# Patient Record
Sex: MALE | Race: WHITE
[De-identification: names, ages, dates, MRNs, and addresses within clinical notes are randomized per-mention and may not be internally consistent; named-entity substitution may affect disease eponyms.]

---

## 2019-03-30 ENCOUNTER — HOSPITAL ENCOUNTER (EMERGENCY)
Dept: HOSPITAL 56 - MW.ED | Age: 24
Discharge: HOME | End: 2019-03-30
Payer: COMMERCIAL

## 2019-03-30 DIAGNOSIS — K08.89: Primary | ICD-10-CM

## 2019-03-30 NOTE — EDM.PDOC
ED HPI GENERAL MEDICAL PROBLEM





- General


Chief Complaint: General


Stated Complaint: TOOTH HURTS


Time Seen by Provider: 03/30/19 01:16





- History of Present Illness


INITIAL COMMENTS - FREE TEXT/NARRATIVE: 


HISTORY AND PHYSICAL:





History of present illness:


Patient's 23-year-old white male presents with concern of dental pain, fever 

chills nausea vomiting or other complaints





Review of systems: 


As per history of present illness and below otherwise all systems reviewed and 

negative.





Past medical history: 


As per history of present illness and as reviewed below otherwise 

noncontributory.





Surgical history: 


As per history of present illness and as reviewed below otherwise 

noncontributory.





Social history: 


No reported history of drug or alcohol abuse.





Family history: 


As per history of present illness and as reviewed below otherwise 

noncontributory.





Physical exam:


HEENT: Atraumatic, normocephalic, pupils reactive, negative for conjunctival 

pallor or scleral icterus, mucous membranes moist, throat clear, neck supple, 

nontender, trachea midline.


Lungs: Clear to auscultation, breath sounds equal bilaterally, chest nontender.


Heart: S1S2, regular, negative for clicks, rubs, or JVD.


Abdomen: Soft, nondistended, nontender. Negative for masses or 

hepatosplenomegaly. Negative for costovertebral tenderness.


Pelvis: Stable nontender.


Genitourinary: Deferred.


Rectal: Deferred.


Extremities: Atraumatic, negative for cords or calf pain. Neurovascular 

unremarkable.


Neuro: Awake, alert, oriented. Cranial nerves II through XII unremarkable. 

Cerebellum unremarkable. Motor and sensory unremarkable throughout. Exam 

nonfocal.





Diagnostics:


None





Therapeutics:


None





Impression: 


#1 dentalgia





Definitive disposition and diagnosis as appropriate pending reevaluation and 

review of above.





  ** Right Lower Tooth/Teeth


Pain Score (Numeric/FACES): 8





- Related Data


Home Meds: 


 Home Meds





. [No Known Home Meds]  03/30/19 [History]











ED ROS GENERAL





- Review of Systems


Review Of Systems: ROS reveals no pertinent complaints other than HPI.





ED EXAM, GENERAL





- Physical Exam


Exam: See Below





Departure





- Departure


Time of Disposition: 01:18


Disposition: Home, Self-Care 01


Condition: Good


Clinical Impression: 


 Dentalgia








- Discharge Information


Referrals: 


PCP,None [Primary Care Provider] - 


Additional Instructions: 


The following information is given to patients seen in the emergency department 

who are being discharged to home. This information is to outline your options 

for follow-up care. We provide all patients seen in our emergency department 

with a follow-up referral.





The need for follow-up, as well as the timing and circumstances, are variable 

depending upon the specifics of your emergency department visit.





If you don't have a primary care physician on staff, we will provide you with a 

referral. We always advise you to contact your personal physician following an 

emergency department visit to inform them of the circumstance of the visit and 

for follow-up with them and/or the need for any referrals to a consulting 

specialist.





The emergency department will also refer you to a specialist when appropriate. 

This referral assures that you have the opportunity for followup care with a 

specialist. All of these measure are taken in an effort to provide you with 

optimal care, which includes your followup.





Under all circumstances we always encourage you to contact your private 

physician who remains a resource for coordinating  your care. When calling for 

followup care, please make the office aware that this follow-up is from your 

recent emergency room visit. If for any reason you are refused follow-up, 

please contact the [facility] emergency department at [phone] 

and asked to speak to the emergency department charge nurse.











Augmentin Naprosyn as prescribed follow-up dentist as discussed return as 

needed as discussed

## 2021-02-13 ENCOUNTER — HOSPITAL ENCOUNTER (EMERGENCY)
Dept: HOSPITAL 56 - MW.ED | Age: 26
Discharge: HOME | End: 2021-02-13
Payer: SELF-PAY

## 2021-02-13 DIAGNOSIS — K04.7: Primary | ICD-10-CM

## 2021-02-13 PROCEDURE — 99282 EMERGENCY DEPT VISIT SF MDM: CPT

## 2021-02-13 NOTE — EDM.PDOC
ED HPI GENERAL MEDICAL PROBLEM





- General


Chief Complaint: ENT Problem


Stated Complaint: ABSCESS TOOTH


Time Seen by Provider: 02/13/21 22:45





- History of Present Illness


INITIAL COMMENTS - FREE TEXT/NARRATIVE: 


Otherwise well 25-year-old male presenting with pain and the right lower molar 

associated with a tooth fracture.  The pain has become quite severe over the 

last couple days.  There is no fever the pain worsens with attempting to eat or 

drink but there is no pain or difficulty swallowing.  No neck pain or stiffness.

 No other symptoms.


  ** Right Lower Tooth/Teeth


Pain Score (Numeric/FACES): 10





- Related Data


                                    Allergies











Allergy/AdvReac Type Severity Reaction Status Date / Time


 


No Known Allergies Allergy   Verified 02/13/21 22:54











Home Meds: 


                                    Home Meds





Acetaminophen/HYDROcodone [Norco 325-5 MG] 1 tab PO ONETIME 3 Days #9 tablet 

02/13/21 [Rx]


Ibuprofen 600 mg PO TIDMEALS 5 Days #15 tablet 02/13/21 [Rx]


Penicillin V Potassium 500 mg PO Q6HR 10 Days #40 tab 02/13/21 [Rx]











Past Medical History





- Past Health History


Medical/Surgical History: Denies Medical/Surgical History





Social & Family History





- Family History


Family Medical History: No Pertinent Family History





- Caffeine Use


Caffeine Use: Reports: None





ED ROS GENERAL





- Review of Systems


Review Of Systems: See Below


Free Text/Narrative/Comment: 





General: No fever.


Eyes: No vision problems.


ENT: Per HPI


Neck: No neck stiffness.





ED EXAM, GENERAL





- Physical Exam


Exam: See Below


Free Text/Narrative:: 


General Appearance: No acute distress, appears comfortable


Skin: No rash


HEENT: Normocephalic/atraumatic, sclera anicteric, mucous membranes moist, right

 lower molar fractured there is no gingival edema there is no palpable 

fluctuance or abscess there is no trismus there is no submental or sublingual 

tenderness or swelling uvula is midline no sign of PTA or RPA


Neck: Normal range of motion


Neurologic: Awake, alert, no obvious deficits, moving all extremities


Psychiatric: Appropriate, cooperative





Course





- Vital Signs


Last Recorded V/S: 


                                Last Vital Signs











Temp  97.2 F   02/13/21 22:52


 


Pulse  67   02/13/21 23:15


 


Resp  18   02/13/21 23:15


 


BP  129/90   02/13/21 23:15


 


Pulse Ox  97   02/13/21 23:15














- Orders/Labs/Meds


Meds: 


Medications














Discontinued Medications














Generic Name Dose Route Start Last Admin





  Trade Name Robin  PRN Reason Stop Dose Admin


 


Hydrocodone Bitart/Acetaminophen  1 tab  02/13/21 22:58  02/13/21 23:06





  Norco 325-5 Mg  PO  02/13/21 22:59  1 tab





  ONETIME ONE   Administration


 


Penicillin V Potassium  500 mg  02/13/21 22:58  02/13/21 23:06





  Veetids  PO  02/13/21 22:59  500 mg





  NOW STA   Administration














Departure





- Departure


Time of Disposition: 22:59


Disposition: Home, Self-Care 01


Condition: Good


Clinical Impression: 


 Dental abscess








- Discharge Information


*PRESCRIPTION DRUG MONITORING PROGRAM REVIEWED*: Not Applicable


*COPY OF PRESCRIPTION DRUG MONITORING REPORT IN PATIENT BALJINDER: Not Applicable


Prescriptions: 


Ibuprofen 600 mg PO TIDMEALS 5 Days #15 tablet


Acetaminophen/HYDROcodone [Norco 325-5 MG] 1 tab PO ONETIME 3 Days #9 tablet


Penicillin V Potassium 500 mg PO Q6HR 10 Days #40 tab


Instructions:  Dental Abscess, Easy-to-Read


Referrals: 


PCP,None [Primary Care Provider] - 


Forms:  ED Department Discharge


Additional Instructions: 


Please follow-up with a dentist as soon as possible.





The following information is given to patients seen in the emergency department 

who are being discharged to home. This information is to outline your options 

for follow-up care. We provide all patients seen in our emergency department 

with a follow-up referral.





The need for follow-up, as well as the timing and circumstances, are variable 

depending upon the specifics of your emergency department visit.





If you don't have a primary care physician on staff, we will provide you with a 

referral. We always advise you to contact your personal physician following an 

emergency department visit to inform them of the circumstance of the visit and 

for follow-up with them and/or the need for any referrals to a consulting 

specialist.





The emergency department will also refer you to a specialist when appropriate. 

This referral assures that you have the opportunity for follow-up care with a 

specialist. All of these measure are taken in an effort to provide you with 

optimal care, which includes your follow-up.





Under all circumstances we always encourage you to contact your private 

physician who remains a resource for coordinating your care. When calling for 

follow-up care, please make the office aware that this follow-up is from your 

recent emergency room visit. If for any reason you are refused follow-up, please

contact the [facility] Emergency Department

at [phone] and asked to speak to the emergency department charge nurse.





Sepsis Event Note (ED)





- Evaluation


Sepsis Screening Result: No Definite Risk





- Focused Exam


Vital Signs: 


                                   Vital Signs











  Temp Pulse Resp BP Pulse Ox


 


 02/13/21 23:15   67  18  129/90  97


 


 02/13/21 22:52  97.2 F  62  18  127/87  99














- Assessment/Plan


Assessment:: 





Otherwise well 25-year-old male presenting with dental pain provided with anti-

inflammatories and antibiotics.  Return precautions discussed and understood 

dental follow-up encouraged.  No sign of deep space infection of the head or 

neck and no drainable abscess at this time.

## 2021-11-06 ENCOUNTER — HOSPITAL ENCOUNTER (EMERGENCY)
Dept: HOSPITAL 56 - MW.ED | Age: 26
LOS: 1 days | Discharge: HOME | End: 2021-11-07
Payer: SELF-PAY

## 2021-11-06 DIAGNOSIS — K08.89: Primary | ICD-10-CM

## 2021-11-06 PROCEDURE — 99282 EMERGENCY DEPT VISIT SF MDM: CPT

## 2021-11-07 NOTE — EDM.PDOC
ED HPI GENERAL MEDICAL PROBLEM





- General


Chief Complaint: General


Stated Complaint: POSSIBLE ABSCESS TOOTH


Time Seen by Provider: 11/06/21 23:56


Source of Information: Reports: Patient


History Limitations: Reports: No Limitations





- History of Present Illness


INITIAL COMMENTS - FREE TEXT/NARRATIVE: 





26-year-old male presents for tooth pain.  Patient has a cracked tooth on the 

right lower side of the face.  He had it for a long time.  It is heard on and 

off for several months but acutely began to become painful today.  He is 

planning on seeing his dentist on Monday but wanted some antibiotics and pain 

medicine for the weekend.


  ** Right Lower Tooth/Teeth


Pain Score (Numeric/FACES): 9





- Related Data


                                    Allergies











Allergy/AdvReac Type Severity Reaction Status Date / Time


 


No Known Allergies Allergy   Verified 11/07/21 00:01











Home Meds: 


                                    Home Meds





Acetaminophen/HYDROcodone [Norco 325-5 MG] 1 tab PO ONETIME 3 Days #9 tablet 

02/13/21 [Rx]


Ibuprofen 600 mg PO TIDMEALS 5 Days #15 tablet 02/13/21 [Rx]


Penicillin V Potassium 500 mg PO Q6HR 10 Days #40 tab 02/13/21 [Rx]











Past Medical History





- Past Health History


Medical/Surgical History: Denies Medical/Surgical History





- Infectious Disease History


Infectious Disease History: Reports: None





Social & Family History





- Family History


Family Medical History: No Pertinent Family History





- Tobacco Use


Tobacco Use Status *Q: Never Tobacco User





- Caffeine Use


Caffeine Use: Reports: None





- Recreational Drug Use


Recreational Drug Use: No





ED ROS GENERAL





- Review of Systems


Review Of Systems: Comprehensive ROS is negative, except as noted in HPI.





ED EXAM, GENERAL





- Physical Exam


Exam: See Below


Exam Limited By: No Limitations


General Appearance: Alert, WD/WN, No Apparent Distress


Ears: Hearing Grossly Normal


Throat/Mouth: Normal Voice, No Airway Compromise, Other (Cracked right lower 

mandibular tooth, no surrounding erythema)


Head: Atraumatic, Normocephalic


Respiratory/Chest: No Respiratory Distress, No Accessory Muscle Use


Cardiovascular: Normal Peripheral Pulses


Extremities: Normal Inspection


Neurological: Alert, Normal Gait


Psychiatric: Normal Affect, Normal Mood


Skin Exam: Warm, Dry, Intact, Normal Color





Course





- Vital Signs


Last Recorded V/S: 





                                Last Vital Signs











Temp  97.9 F   11/07/21 00:01


 


Pulse  69   11/07/21 00:01


 


Resp  17   11/07/21 00:01


 


BP  130/72   11/07/21 00:01


 


Pulse Ox  98   11/07/21 00:01














- Orders/Labs/Meds


Orders: 





                               Active Orders 24 hr











 Category Date Time Status


 


 Acetaminophen/oxyCODONE [Percocet 325-5 MG] Med  11/07/21 00:22 Once





 1 tab PO ONETIME ONE   


 


 Amoxicillin/Clavulanate K [Augmentin 875 MG/125 MG] Med  11/07/21 00:22 Once





 1 tab PO ONETIME ONE   














- Re-Assessments/Exams


Free Text/Narrative Re-Assessment/Exam: 





11/07/21 00:23


We will give pain medicine and antibiotics.  Recommend dental follow-up.





Departure





- Departure


Time of Disposition: 00:24


Disposition: Home, Self-Care 01


Condition: Good


Clinical Impression: 


 Pain, dental








- Discharge Information


Instructions:  Dental Pain


Referrals: 


PCP,None [Primary Care Provider] - 


Additional Instructions: 


Please follow-up with a dentist.


The following information is given to patients seen in the emergency department 

who are being discharged to home. This information is to outline your options 

for follow-up care. We provide all patients seen in our emergency department 

with a follow-up referral.





The need for follow-up, as well as the timing and circumstances, are variable 

depending upon the specifics of your emergency department visit.





If you don't have a primary care physician on staff, we will provide you with a 

referral. We always advise you to contact your personal physician following an 

emergency department visit to inform them of the circumstance of the visit and 

for follow-up with them and/or the need for any referrals to a consulting 

specialist.





The emergency department will also refer you to a specialist when appropriate. 

This referral assures that you have the opportunity for follow-up care with a 

specialist. All of these measure are taken in an effort to provide you with 

optimal care, which includes your follow-up.





Under all circumstances we always encourage you to contact your private 

physician who remains a resource for coordinating your care. When calling for 

follow-up care, please make the office aware that this follow-up is from your 

recent emergency room visit. If for any reason you are refused follow-up, please

contact the [facility] Emergency Department

at [phone] and asked to speak to the emergency department charge nurse.





Please follow up with your primary care physician. If you do not have a primary 

care physician, see below:


[facility]


[address], [place] [id number] [phone]





[facility]


[address], [place] [address] [phone]








[facility]


[address] [place] [address]


Phone: [phone]


Fax: [phone]











Sepsis Event Note (ED)





- Focused Exam


Vital Signs: 





                                   Vital Signs











  Temp Pulse Resp BP Pulse Ox


 


 11/07/21 00:01  97.9 F  69  17  130/72  98














- My Orders


Last 24 Hours: 





My Active Orders





11/07/21 00:22


Acetaminophen/oxyCODONE [Percocet 325-5 MG]   1 tab PO ONETIME ONE 


Amoxicillin/Clavulanate K [Augmentin 875 MG/125 MG]   1 tab PO ONETIME ONE 














- Assessment/Plan


Last 24 Hours: 





My Active Orders





11/07/21 00:22


Acetaminophen/oxyCODONE [Percocet 325-5 MG]   1 tab PO ONETIME ONE 


Amoxicillin/Clavulanate K [Augmentin 875 MG/125 MG]   1 tab PO ONETIME ONE

## 2023-01-11 ENCOUNTER — HOSPITAL ENCOUNTER (EMERGENCY)
Dept: HOSPITAL 56 - MW.ED | Age: 28
Discharge: HOME | End: 2023-01-11
Payer: COMMERCIAL

## 2023-01-11 DIAGNOSIS — Z20.822: ICD-10-CM

## 2023-01-11 DIAGNOSIS — J10.1: Primary | ICD-10-CM

## 2023-01-11 LAB
FLUAV RNA UPPER RESP QL NAA+PROBE: POSITIVE
FLUBV RNA UPPER RESP QL NAA+PROBE: NEGATIVE
SARS-COV-2 RNA RESP QL NAA+PROBE: NEGATIVE

## 2023-01-11 PROCEDURE — 0240U: CPT

## 2023-01-11 PROCEDURE — 99283 EMERGENCY DEPT VISIT LOW MDM: CPT

## 2023-08-27 ENCOUNTER — HOSPITAL ENCOUNTER (EMERGENCY)
Dept: HOSPITAL 56 - MW.ED | Age: 28
Discharge: HOME | End: 2023-08-27
Payer: SELF-PAY

## 2023-08-27 DIAGNOSIS — U07.1: Primary | ICD-10-CM

## 2023-08-27 DIAGNOSIS — Z28.310: ICD-10-CM

## 2023-08-27 LAB
ALBUMIN SERPL-MCNC: 4 G/DL (ref 3.4–5)
ALBUMIN/GLOB SERPL: 1.1 {RATIO} (ref 0.9–1.6)
ALP SERPL-CCNC: 80 U/L (ref 46–116)
ALT SERPL-CCNC: 54 IU/L (ref 14–63)
AST SERPL-CCNC: 28 IU/L (ref 15–37)
BASOPHILS # BLD AUTO: 0 K/UL (ref 0–0.1)
BASOPHILS NFR BLD AUTO: 0.3 % (ref 0–1.5)
BILIRUB SERPL-MCNC: 1.5 MG/DL (ref 0.2–1)
BUN SERPL-MCNC: 11 MG/DL (ref 7–18)
CALCIUM SERPL-MCNC: 8.6 MG/DL (ref 8.5–10.1)
CHLORIDE SERPL-SCNC: 96 MMOL/L (ref 98–107)
CO2 SERPL-SCNC: 26.4 MMOL/L (ref 21–32)
CREAT CL 24H UR+SERPL-VRATE: 105.6 ML/MIN
CREAT SERPL-MCNC: 1.2 MG/DL (ref 0.8–1.3)
EGFRCR SERPLBLD CKD-EPI 2021: 84 ML/MIN (ref 60–?)
EOSINOPHIL # BLD AUTO: 0 K/UL (ref 0–0.7)
EOSINOPHIL NFR BLD AUTO: 0 % (ref 0–7)
GLOBULIN SER-MCNC: 3.8 G/DL (ref 2.6–4)
GLUCOSE SERPL-MCNC: 99 MG/DL (ref 74–106)
HCT VFR BLD AUTO: 44.2 % (ref 38–50)
HGB BLD-MCNC: 15.9 G/DL (ref 13–17)
INR PPP: 1 (ref 0.86–1.11)
LIPASE SERPL-CCNC: 25 U/L (ref 16–77)
LYMPHOCYTES # BLD AUTO: 1.1 K/UL (ref 0.6–2.4)
LYMPHOCYTES NFR BLD AUTO: 15.2 % (ref 16–40)
MCH RBC QN AUTO: 30.9 PG (ref 27–32)
MCHC RBC AUTO-ENTMCNC: 36 G/DL (ref 31–37)
MCHC RBC AUTO-ENTMCNC: 86 FL (ref 80–98)
MONOCYTES # BLD AUTO: 0.9 K/UL (ref 0–0.8)
MONOCYTES NFR BLD AUTO: 12.7 % (ref 0–15)
NEUTROPHILS # BLD AUTO: 5.1 K/UL (ref 1.4–5.7)
NEUTROPHILS NFR BLD AUTO: 71.8 % (ref 48–80)
NRBC BLD AUTO-RTO: 0 /100WBC
NRBC BLD AUTO-RTO: 0 K/UL
PLATELET # BLD AUTO: 180 K/UL (ref 150–400)
PMV BLD AUTO: 10.2 FL (ref 7.4–12)
POTASSIUM SERPL-SCNC: 3.7 MMOL/L (ref 3.5–5.1)
PROT SERPL-MCNC: 7.8 G/DL (ref 6.4–8.2)
RBC # BLD AUTO: 5.14 M/UL (ref 4.5–5.9)
SODIUM SERPL-SCNC: 134 MMOL/L (ref 136–148)
WBC # BLD AUTO: 7.11 K/UL (ref 4–11)

## 2023-08-27 PROCEDURE — 84484 ASSAY OF TROPONIN QUANT: CPT

## 2023-08-27 PROCEDURE — 93005 ELECTROCARDIOGRAM TRACING: CPT

## 2023-08-27 PROCEDURE — 85025 COMPLETE CBC W/AUTO DIFF WBC: CPT

## 2023-08-27 PROCEDURE — 99285 EMERGENCY DEPT VISIT HI MDM: CPT

## 2023-08-27 PROCEDURE — 83690 ASSAY OF LIPASE: CPT

## 2023-08-27 PROCEDURE — 85610 PROTHROMBIN TIME: CPT

## 2023-08-27 PROCEDURE — 87651 STREP A DNA AMP PROBE: CPT

## 2023-08-27 PROCEDURE — 71046 X-RAY EXAM CHEST 2 VIEWS: CPT

## 2023-08-27 PROCEDURE — 87040 BLOOD CULTURE FOR BACTERIA: CPT

## 2023-08-27 PROCEDURE — 80053 COMPREHEN METABOLIC PANEL: CPT

## 2023-08-27 PROCEDURE — 85379 FIBRIN DEGRADATION QUANT: CPT

## 2023-08-27 PROCEDURE — 36415 COLL VENOUS BLD VENIPUNCTURE: CPT

## 2023-08-27 PROCEDURE — U0002 COVID-19 LAB TEST NON-CDC: HCPCS

## 2023-08-27 PROCEDURE — 83605 ASSAY OF LACTIC ACID: CPT

## 2023-08-27 PROCEDURE — 87635 SARS-COV-2 COVID-19 AMP PRB: CPT

## 2025-05-11 ENCOUNTER — HOSPITAL ENCOUNTER (EMERGENCY)
Dept: HOSPITAL 56 - MW.ED | Age: 30
LOS: 1 days | Discharge: HOME | End: 2025-05-12
Payer: COMMERCIAL

## 2025-05-11 DIAGNOSIS — R06.02: Primary | ICD-10-CM

## 2025-05-11 DIAGNOSIS — F17.290: ICD-10-CM

## 2025-05-11 DIAGNOSIS — F41.9: ICD-10-CM

## 2025-05-12 LAB
ALBUMIN SERPL-MCNC: 3.9 G/DL (ref 3.4–5)
ALBUMIN/GLOB SERPL: 1.3 {RATIO} (ref 0.9–1.6)
ALP SERPL-CCNC: 104 U/L (ref 46–116)
ALT SERPL-CCNC: 48 IU/L (ref 14–63)
AMPHET UR QL SCN: NEGATIVE
AMPHET UR QL SCN: NEGATIVE
APPEARANCE UR: CLEAR
AST SERPL-CCNC: 23 IU/L (ref 15–37)
BACTERIA URNS QL MICRO: (no result)
BARBITURATES UR QL SCN: NEGATIVE
BASOPHILS # BLD AUTO: 0.09 K/UL (ref 0–0.2)
BENZODIAZ UR QL SCN: NEGATIVE
BILIRUB SERPL-MCNC: 0.7 MG/DL (ref 0.2–1)
BILIRUB UR STRIP-MCNC: NEGATIVE MG/DL
BNP SERPL-MCNC: 26 PG/ML (ref 0–125)
BUN SERPL-MCNC: 16 MG/DL (ref 7–18)
BUPRENORPHINE UR QL: NEGATIVE
CALCIUM SERPL-MCNC: 8.9 MG/DL (ref 8.5–10.1)
CHLORIDE SERPL-SCNC: 104 MMOL/L (ref 98–107)
CO2 SERPL-SCNC: 26.7 MMOL/L (ref 21–32)
COLOR UR: YELLOW
CREAT CL 24H UR+SERPL-VRATE: 114.17 ML/MIN
CREAT SERPL-MCNC: 1.1 MG/DL (ref 0.8–1.3)
EGFRCR SERPLBLD CKD-EPI 2021: 93 ML/MIN (ref 60–?)
EOSINOPHIL # BLD AUTO: 0.28 K/UL (ref 0–0.45)
EOSINOPHIL NFR BLD AUTO: 3.2 % (ref 0–6)
EPI CELLS #/AREA URNS HPF: (no result) /[HPF]
GLUCOSE SERPL-MCNC: 104 MG/DL (ref 74–106)
GLUCOSE UR STRIP-MCNC: NEGATIVE MG/DL
HCT VFR BLD AUTO: 44.2 % (ref 42–52)
HGB BLD-MCNC: 15.7 G/DL (ref 14–18)
IMM GRANULOCYTES # BLD: 0.01 K/UL (ref 0–0.05)
IMM GRANULOCYTES NFR BLD: 0.1 % (ref 0–0.4)
KETONES UR STRIP-MCNC: NEGATIVE MG/DL
LYMPHOCYTES NFR BLD AUTO: 39.9 % (ref 24–44)
MCH RBC QN AUTO: 30.1 PG (ref 28–32)
MCHC RBC AUTO-ENTMCNC: 35.5 G/DL (ref 32–36)
MCHC RBC AUTO-ENTMCNC: 84.7 FL (ref 83–99)
METHADONE UR QL SCN: NEGATIVE
MONOCYTES # BLD AUTO: 0.68 K/UL (ref 0–0.8)
MONOCYTES NFR BLD AUTO: 7.8 % (ref 0–8)
MUCOUS THREADS URNS QL MICRO: (no result)
NEUTROPHILS # BLD AUTO: 4.21 K/UL (ref 1.8–7.7)
NITRITE UR QL: NEGATIVE
OXYCODONE UR QL SCN: NEGATIVE
PCP UR QL SCN>25 NG/ML: NEGATIVE
PLATELET # BLD AUTO: 272 K/UL (ref 150–400)
PMV BLD AUTO: 9.9 FL (ref 9.4–12.4)
POTASSIUM SERPL-SCNC: 3.6 MMOL/L (ref 3.5–5.1)
PROT SERPL-MCNC: 6.9 G/DL (ref 6.4–8.2)
PROT UR STRIP-MCNC: NEGATIVE MG/DL
RBC # BLD AUTO: 5.22 M/UL (ref 4.52–5.9)
RBC # URNS HPF: (no result) /ML
RBC UR QL: NEGATIVE
SODIUM SERPL-SCNC: 141 MMOL/L (ref 136–148)
SP GR UR STRIP: <= 1.005 (ref 1–1.03)
THC UR QL SCN>20 NG/ML: NEGATIVE
UROBILINOGEN UR STRIP-ACNC: 0.2 EU/DL (ref ?–2)
WBC # BLD AUTO: 8.77 K/UL (ref 3.9–11.3)

## 2025-07-07 ENCOUNTER — HOSPITAL ENCOUNTER (EMERGENCY)
Dept: HOSPITAL 56 - MW.ED | Age: 30
Discharge: HOME | End: 2025-07-07
Payer: COMMERCIAL

## 2025-07-07 DIAGNOSIS — J02.9: Primary | ICD-10-CM

## 2025-07-07 DIAGNOSIS — H66.93: ICD-10-CM

## 2025-07-07 DIAGNOSIS — J32.9: ICD-10-CM

## 2025-07-21 ENCOUNTER — HOSPITAL ENCOUNTER (EMERGENCY)
Dept: HOSPITAL 56 - MW.ED | Age: 30
Discharge: HOME | End: 2025-07-21
Payer: COMMERCIAL

## 2025-07-21 DIAGNOSIS — Z79.899: ICD-10-CM

## 2025-07-21 DIAGNOSIS — J06.9: Primary | ICD-10-CM

## 2025-07-21 DIAGNOSIS — Z86.16: ICD-10-CM

## 2025-07-21 DIAGNOSIS — Z75.3: ICD-10-CM

## 2025-07-21 LAB
ALBUMIN SERPL-MCNC: 4.4 G/DL (ref 3.4–5)
ALBUMIN/GLOB SERPL: 1.3 {RATIO} (ref 0.9–1.6)
ALP SERPL-CCNC: 99 U/L (ref 46–116)
ALT SERPL-CCNC: 80 IU/L (ref 14–63)
AST SERPL-CCNC: 34 IU/L (ref 15–37)
BASOPHILS # BLD AUTO: 0.07 K/UL (ref 0–0.2)
BASOPHILS NFR BLD AUTO: 0.8 % (ref 0–1)
BILIRUB SERPL-MCNC: 1.2 MG/DL (ref 0.2–1)
BUN SERPL-MCNC: 12 MG/DL (ref 7–18)
CALCIUM SERPL-MCNC: 9.1 MG/DL (ref 8.5–10.1)
CHLORIDE SERPL-SCNC: 101 MMOL/L (ref 98–107)
CO2 SERPL-SCNC: 27.6 MMOL/L (ref 21–32)
CREAT CL 24H UR+SERPL-VRATE: 114.17 ML/MIN
CREAT SERPL-MCNC: 1.1 MG/DL (ref 0.8–1.3)
EGFRCR SERPLBLD CKD-EPI 2021: 93 ML/MIN (ref 60–?)
EOSINOPHIL # BLD AUTO: 0.14 K/UL (ref 0–0.45)
EOSINOPHIL NFR BLD AUTO: 1.5 % (ref 0–6)
GLOBULIN SER-MCNC: 3.3 G/DL (ref 2.6–4)
GLUCOSE SERPL-MCNC: 90 MG/DL (ref 74–106)
HCT VFR BLD AUTO: 45.3 % (ref 42–52)
HGB BLD-MCNC: 16.7 G/DL (ref 14–18)
IMM GRANULOCYTES # BLD: 0.03 K/UL (ref 0–0.05)
IMM GRANULOCYTES NFR BLD: 0.3 % (ref 0–0.4)
LYMPHOCYTES # BLD AUTO: 2.94 K/UL (ref 1–4.8)
LYMPHOCYTES NFR BLD AUTO: 32.2 % (ref 24–44)
MCH RBC QN AUTO: 31.3 PG (ref 28–32)
MCHC RBC AUTO-ENTMCNC: 36.9 G/DL (ref 32–36)
MCHC RBC AUTO-ENTMCNC: 85 FL (ref 83–99)
MONOCYTES # BLD AUTO: 0.72 K/UL (ref 0–0.8)
MONOCYTES NFR BLD AUTO: 7.9 % (ref 0–8)
NEUTROPHILS # BLD AUTO: 5.23 K/UL (ref 1.8–7.7)
NEUTROPHILS NFR BLD AUTO: 57.3 % (ref 41–71)
NRBC BLD AUTO-RTO: 0 /100WBC (ref 0–0.2)
NRBC BLD AUTO-RTO: 0 K/UL (ref 0–0.02)
PLATELET # BLD AUTO: 290 K/UL (ref 150–400)
PMV BLD AUTO: 9.7 FL (ref 9.4–12.4)
POTASSIUM SERPL-SCNC: 3.5 MMOL/L (ref 3.5–5.1)
PROT SERPL-MCNC: 7.7 G/DL (ref 6.4–8.2)
RBC # BLD AUTO: 5.33 M/UL (ref 4.52–5.9)
SODIUM SERPL-SCNC: 139 MMOL/L (ref 136–148)
WBC # BLD AUTO: 9.13 K/UL (ref 3.9–11.3)